# Patient Record
Sex: MALE | Race: WHITE | NOT HISPANIC OR LATINO | Employment: OTHER | URBAN - METROPOLITAN AREA
[De-identification: names, ages, dates, MRNs, and addresses within clinical notes are randomized per-mention and may not be internally consistent; named-entity substitution may affect disease eponyms.]

---

## 2023-03-26 DIAGNOSIS — G89.4 CHRONIC PAIN SYNDROME: Primary | ICD-10-CM

## 2023-03-27 RX ORDER — NAPROXEN 25 MG/ML
SUSPENSION ORAL
Qty: 500 ML | Refills: 1 | Status: SHIPPED | OUTPATIENT
Start: 2023-03-27 | End: 2023-07-11

## 2023-04-22 DIAGNOSIS — G89.4 CHRONIC PAIN SYNDROME: Primary | ICD-10-CM

## 2023-04-24 RX ORDER — LIDOCAINE 50 MG/G
PATCH TOPICAL
Qty: 150 PATCH | Refills: 0 | Status: SHIPPED | OUTPATIENT
Start: 2023-04-24 | End: 2023-05-05

## 2023-05-05 DIAGNOSIS — G89.4 CHRONIC PAIN SYNDROME: ICD-10-CM

## 2023-05-05 RX ORDER — LIDOCAINE 50 MG/G
PATCH TOPICAL
Qty: 150 PATCH | Refills: 0 | Status: SHIPPED
Start: 2023-05-05 | End: 2024-02-15 | Stop reason: ALTCHOICE

## 2023-07-10 DIAGNOSIS — G89.4 CHRONIC PAIN SYNDROME: ICD-10-CM

## 2023-07-11 RX ORDER — NAPROXEN 25 MG/ML
SUSPENSION ORAL
Qty: 500 ML | Refills: 1 | Status: SHIPPED | OUTPATIENT
Start: 2023-07-11 | End: 2024-01-31

## 2023-07-17 PROBLEM — L03.90 CELLULITIS: Status: RESOLVED | Noted: 2023-07-17 | Resolved: 2023-07-17

## 2023-07-17 PROBLEM — M79.672 FOOT PAIN, BILATERAL: Status: ACTIVE | Noted: 2023-07-17

## 2023-07-17 PROBLEM — B35.1 TOENAIL FUNGUS: Status: ACTIVE | Noted: 2023-07-17

## 2023-07-17 PROBLEM — T07.XXXA WOUNDS, MULTIPLE: Status: ACTIVE | Noted: 2023-07-17

## 2023-07-17 PROBLEM — U07.1 COVID-19: Status: RESOLVED | Noted: 2023-07-17 | Resolved: 2023-07-17

## 2023-07-17 PROBLEM — M20.10 VALGUS DEFORMITY OF GREAT TOE: Status: ACTIVE | Noted: 2023-07-17

## 2023-07-17 PROBLEM — M61.10: Status: ACTIVE | Noted: 2023-07-17

## 2023-07-17 PROBLEM — M79.671 FOOT PAIN, BILATERAL: Status: ACTIVE | Noted: 2023-07-17

## 2023-07-17 PROBLEM — N49.2: Status: RESOLVED | Noted: 2023-07-17 | Resolved: 2023-07-17

## 2023-07-17 PROBLEM — L98.9 SKIN LESION: Status: ACTIVE | Noted: 2023-07-17

## 2023-07-17 PROBLEM — G47.9 SLEEP DISORDER: Status: ACTIVE | Noted: 2023-07-17

## 2023-07-17 PROBLEM — R26.9 GAIT DISORDER: Status: ACTIVE | Noted: 2023-07-17

## 2023-07-17 PROBLEM — M79.89 ARM SWELLING: Status: ACTIVE | Noted: 2023-07-17

## 2023-07-17 PROBLEM — M79.602 PAIN OF LEFT UPPER EXTREMITY: Status: ACTIVE | Noted: 2023-07-17

## 2023-07-17 PROBLEM — K40.90 INGUINAL HERNIA: Status: ACTIVE | Noted: 2023-07-17

## 2023-07-17 PROBLEM — R60.0 LEG EDEMA: Status: RESOLVED | Noted: 2023-07-17 | Resolved: 2023-07-17

## 2023-07-17 PROBLEM — J06.9 ACUTE UPPER RESPIRATORY INFECTION: Status: RESOLVED | Noted: 2023-07-17 | Resolved: 2023-07-17

## 2023-07-17 PROBLEM — K59.00 CONSTIPATION: Status: ACTIVE | Noted: 2023-07-17

## 2023-07-17 PROBLEM — G89.4 CHRONIC PAIN SYNDROME: Status: ACTIVE | Noted: 2023-07-17

## 2023-07-17 RX ORDER — POLYETHYLENE GLYCOL 3350 17 G/17G
POWDER, FOR SOLUTION ORAL
COMMUNITY
Start: 2023-02-14

## 2023-07-17 RX ORDER — CICLOPIROX 1 G/100ML
SHAMPOO TOPICAL
COMMUNITY
End: 2023-07-18 | Stop reason: SDUPTHER

## 2023-07-18 ENCOUNTER — OFFICE VISIT (OUTPATIENT)
Dept: PRIMARY CARE | Facility: CLINIC | Age: 33
End: 2023-07-18
Payer: MEDICAID

## 2023-07-18 VITALS
BODY MASS INDEX: 17.67 KG/M2 | WEIGHT: 90 LBS | HEART RATE: 97 BPM | SYSTOLIC BLOOD PRESSURE: 98 MMHG | DIASTOLIC BLOOD PRESSURE: 68 MMHG | HEIGHT: 60 IN | OXYGEN SATURATION: 97 %

## 2023-07-18 DIAGNOSIS — L30.9 DERMATITIS: ICD-10-CM

## 2023-07-18 DIAGNOSIS — M61.10 FOP (FIBRODYSPLASIA OSSIFICANS PROGRESSIVA): Primary | ICD-10-CM

## 2023-07-18 PROCEDURE — 99213 OFFICE O/P EST LOW 20 MIN: CPT | Performed by: INTERNAL MEDICINE

## 2023-07-18 PROCEDURE — 1036F TOBACCO NON-USER: CPT | Performed by: INTERNAL MEDICINE

## 2023-07-18 RX ORDER — FEEDING PUMP
EACH MISCELLANEOUS
COMMUNITY
Start: 2022-07-27

## 2023-07-18 RX ORDER — CICLOPIROX 1 G/100ML
1 SHAMPOO TOPICAL 3 TIMES WEEKLY
Qty: 120 ML | Refills: 3 | Status: SHIPPED | OUTPATIENT
Start: 2023-07-19 | End: 2024-02-15 | Stop reason: SDUPTHER

## 2023-07-18 RX ORDER — PREDNISONE 20 MG/1
TABLET ORAL
COMMUNITY
Start: 2016-12-22 | End: 2024-02-15 | Stop reason: SDUPTHER
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
## 2023-08-04 DIAGNOSIS — M62.50 MUSCULAR ATROPHY, UNSPECIFIED SITE: Primary | ICD-10-CM

## 2023-11-09 PROBLEM — B35.1 ONYCHOMYCOSIS: Status: ACTIVE | Noted: 2023-11-09

## 2023-11-09 PROBLEM — L02.92 FURUNCLE: Status: ACTIVE | Noted: 2023-11-09
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
## 2023-11-09 PROBLEM — R52 CHRONIC GENERALIZED PAIN: Status: ACTIVE | Noted: 2023-11-09

## 2023-11-09 PROBLEM — M20.10 HAV (HALLUX ABDUCTO VALGUS): Status: ACTIVE | Noted: 2023-11-09

## 2023-11-09 PROBLEM — G89.29 CHRONIC GENERALIZED PAIN: Status: ACTIVE | Noted: 2023-11-09

## 2023-11-09 PROBLEM — L21.9 SEBORRHEIC DERMATITIS, UNSPECIFIED: Status: ACTIVE | Noted: 2021-07-21

## 2023-11-09 PROBLEM — L85.3 XEROSIS CUTIS: Status: ACTIVE | Noted: 2021-07-21

## 2023-11-09 PROBLEM — K40.90 UNILATERAL INGUINAL HERNIA, WITHOUT OBSTRUCTION OR GANGRENE, NOT SPECIFIED AS RECURRENT: Status: ACTIVE | Noted: 2022-10-10

## 2023-11-09 PROBLEM — M61.10: Status: ACTIVE | Noted: 2023-11-09

## 2023-11-09 RX ORDER — DOCUSATE SODIUM 100 MG/1
CAPSULE, LIQUID FILLED ORAL 2 TIMES DAILY
COMMUNITY
Start: 2022-10-10

## 2023-11-09 RX ORDER — CICLOPIROX OLAMINE 7.7 MG/G
1 CREAM TOPICAL
COMMUNITY
Start: 2021-07-21

## 2023-11-09 RX ORDER — LIDOCAINE 560 MG/1
1 PATCH PERCUTANEOUS; TOPICAL; TRANSDERMAL EVERY 12 HOURS PRN
COMMUNITY
End: 2024-02-15 | Stop reason: ALTCHOICE

## 2023-11-09 RX ORDER — HYDROCORTISONE 25 MG/G
1 CREAM TOPICAL
COMMUNITY
Start: 2021-07-21 | End: 2024-02-15 | Stop reason: ALTCHOICE

## 2023-11-09 RX ORDER — FLUOCINOLONE ACETONIDE 0.1 MG/ML
1 SOLUTION TOPICAL
COMMUNITY
Start: 2021-07-21

## 2023-11-09 RX ORDER — HYDROXYZINE HYDROCHLORIDE 10 MG/1
TABLET, FILM COATED ORAL 4 TIMES DAILY
COMMUNITY
Start: 2022-10-10 | End: 2024-02-15 | Stop reason: ALTCHOICE

## 2023-11-10 ENCOUNTER — TELEMEDICINE CLINICAL SUPPORT (OUTPATIENT)
Dept: NUTRITION | Facility: CLINIC | Age: 33
End: 2023-11-10
Payer: MEDICAID

## 2023-11-10 VITALS — BODY MASS INDEX: 17.66 KG/M2 | HEIGHT: 60 IN | WEIGHT: 89.95 LBS

## 2023-11-10 DIAGNOSIS — M61.10 FOP (FIBRODYSPLASIA OSSIFICANS PROGRESSIVA): Primary | ICD-10-CM

## 2023-11-10 DIAGNOSIS — M62.50 MUSCULAR ATROPHY, UNSPECIFIED SITE: ICD-10-CM

## 2023-11-10 PROCEDURE — 97802 MEDICAL NUTRITION INDIV IN: CPT | Performed by: DIETITIAN, REGISTERED

## 2023-11-10 PROCEDURE — 97802 MEDICAL NUTRITION INDIV IN: CPT | Mod: GT,U1,PO | Performed by: DIETITIAN, REGISTERED
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
## 2024-01-23 DIAGNOSIS — G89.4 CHRONIC PAIN SYNDROME: ICD-10-CM

## 2024-01-31 RX ORDER — NAPROXEN 25 MG/ML
SUSPENSION ORAL
Qty: 500 ML | Refills: 0 | Status: SHIPPED | OUTPATIENT
Start: 2024-01-31 | End: 2024-02-15 | Stop reason: SDUPTHER

## 2024-02-15 ENCOUNTER — TELEMEDICINE (OUTPATIENT)
Dept: PRIMARY CARE | Facility: CLINIC | Age: 34
End: 2024-02-15
Payer: MEDICAID

## 2024-02-15 DIAGNOSIS — G89.4 CHRONIC PAIN SYNDROME: ICD-10-CM

## 2024-02-15 DIAGNOSIS — L30.9 DERMATITIS: ICD-10-CM

## 2024-02-15 DIAGNOSIS — K40.90 RIGHT INGUINAL HERNIA: Primary | ICD-10-CM

## 2024-02-15 DIAGNOSIS — M61.10 FOP (FIBRODYSPLASIA OSSIFICANS PROGRESSIVA): ICD-10-CM

## 2024-02-15 PROCEDURE — 1036F TOBACCO NON-USER: CPT | Performed by: INTERNAL MEDICINE

## 2024-02-15 PROCEDURE — 99441 PR PHYS/QHP TELEPHONE EVALUATION 5-10 MIN: CPT | Performed by: INTERNAL MEDICINE

## 2024-02-15 PROCEDURE — 3008F BODY MASS INDEX DOCD: CPT | Performed by: INTERNAL MEDICINE

## 2024-02-15 RX ORDER — CICLOPIROX 1 G/100ML
1 SHAMPOO TOPICAL 3 TIMES WEEKLY
Qty: 120 ML | Refills: 3 | Status: SHIPPED | OUTPATIENT
Start: 2024-02-16

## 2024-02-15 RX ORDER — PREDNISONE 20 MG/1
20 TABLET ORAL DAILY
Qty: 90 TABLET | Refills: 1 | Status: SHIPPED | OUTPATIENT
Start: 2024-02-15

## 2024-02-15 RX ORDER — NAPROXEN 25 MG/ML
SUSPENSION ORAL
Qty: 500 ML | Refills: 0 | Status: SHIPPED | OUTPATIENT
Start: 2024-02-15 | End: 2024-04-15

## 2024-02-15 NOTE — PROGRESS NOTES
Subjective   Patient ID: James Pittman is a 33 y.o. male who presents for Follow-up and Med Refill.  HPI        past medical history fibrodysplasia ossificans progressiva, constipation, and R inguinal hernia.     Patient overall doing okay but does note chronic right inguinal hernia intermittently over the years grade 3 out of 10 sometimes worse with walking standing moving lifting  Occasionally has a little bit of pain in the area none at present  Denies any constipation obstipation  Patient states he was to have a virtual appointment with the surgeon but he states the surgeon never showed up so he did not get it evaluated it was a coordinated call with his specialist with FOP as well      Health Maintenance:      Colonoscopy:      Mammogram:      Pelvic/Pap:      Low dose chest CT:      Aorta duplex:      Optho:      Podiatry:        Vaccines:      Prevnar 20:      Prevnar 13:      Pneumovax 23:      Tdap:      Shingrix:      COVID:      Influenza:        ROS:      General: denies fever/chills/weight loss      Head: denies HA/trauma/masses/dizziness      Eyes: denies vision change/loss of vision/blurry vision/diplopia/eye pain      Ears: denies hearing loss/tinnitus/otalgia/otorrhea      Nose: denies nasal drainage/anosmia      Throat: denies dysphagia/odynophagia      Lymphatics: denies lymph node swelling      Cardiac: denies CP/palpitations/orthopnea/PND      Pulmonary: denies dyspnea/cough/wheezing      GI: Chronic intermittent inguinal hernia denies abd pain/n/v/diarrhea/melena/hematochezia/hematemesis      : denies dysuria/hematuria/change frequency      Genital: denies genital discharge/lesions      Skin: Chronic intermittent skin ulcerations from his underlying FOP condition states he has a spot on the right leg but seems to be improving denies rashes/lesions/masses      MSK: denies weakness/swelling/edema/gait imbalance/pain      Neuro: denies paresthesias/seizures/dysarthria      Psych: denies  "depression/anxiety/suicidal or homicidal ideations            Objective   There were no vitals taken for this visit.     Physical Exam:     General: AO3, NAD     Speaking full sentences nonlabored  Appropriate mood and affect                    No results found for: \"BMPR1A\", \"CBCDIF\"      Assessment/Plan   Diagnoses and all orders for this visit:  Right inguinal hernia  Comments:  Reducible  Orders:  -     Referral to General Surgery; Future  Dermatitis  -     ciclopirox 1 % shampoo; Apply 1 Application topically 3 times a week.  Chronic pain syndrome  -     naproxen (Naprosyn) 125 mg/5 mL suspension; TAKE 10ML BY MOUTH TWO TIMES A DAY AS NEEDED FOR PAIN  -     predniSONE (Deltasone) 20 mg tablet; Take 1 tablet (20 mg) by mouth once daily.  FOP (fibrodysplasia ossificans progressiva)    Go to the ER for any severe pain or hernia that does not go back in    Continue home care nursing    Thank you for making appointment today Héctor    Please follow-up 6 months    Ezra Mccartney DO, FACOI       Ezra Mccartney DOFollow up and med refill  "

## 2024-04-13 DIAGNOSIS — G89.4 CHRONIC PAIN SYNDROME: ICD-10-CM

## 2024-04-15 RX ORDER — NAPROXEN 25 MG/ML
SUSPENSION ORAL
Qty: 500 ML | Refills: 0 | Status: SHIPPED | OUTPATIENT
Start: 2024-04-15

## 2024-06-18 ENCOUNTER — APPOINTMENT (OUTPATIENT)
Dept: PRIMARY CARE | Facility: CLINIC | Age: 34
End: 2024-06-18
Payer: MEDICAID

## 2024-07-18 ENCOUNTER — APPOINTMENT (OUTPATIENT)
Dept: PRIMARY CARE | Facility: CLINIC | Age: 34
End: 2024-07-18
Payer: MEDICAID

## 2024-07-18 VITALS — SYSTOLIC BLOOD PRESSURE: 102 MMHG | DIASTOLIC BLOOD PRESSURE: 68 MMHG

## 2024-07-18 DIAGNOSIS — G89.4 CHRONIC PAIN SYNDROME: ICD-10-CM

## 2024-07-18 DIAGNOSIS — K64.9 HEMORRHOIDS, UNSPECIFIED HEMORRHOID TYPE: ICD-10-CM

## 2024-07-18 DIAGNOSIS — L30.9 DERMATITIS: ICD-10-CM

## 2024-07-18 DIAGNOSIS — M61.10 FOP (FIBRODYSPLASIA OSSIFICANS PROGRESSIVA): Primary | ICD-10-CM

## 2024-07-18 PROCEDURE — 99215 OFFICE O/P EST HI 40 MIN: CPT | Performed by: INTERNAL MEDICINE

## 2024-07-18 RX ORDER — PREDNISONE 20 MG/1
20 TABLET ORAL DAILY
Qty: 90 TABLET | Refills: 0 | Status: SHIPPED | OUTPATIENT
Start: 2024-07-18

## 2024-07-18 RX ORDER — CICLOPIROX 1 G/100ML
1 SHAMPOO TOPICAL 3 TIMES WEEKLY
Qty: 120 ML | Refills: 3 | Status: SHIPPED | OUTPATIENT
Start: 2024-07-18

## 2024-07-18 RX ORDER — HYDROCORTISONE ACETATE 25 MG/1
25 SUPPOSITORY RECTAL 2 TIMES DAILY PRN
Qty: 30 SUPPOSITORY | Refills: 11 | Status: SHIPPED | OUTPATIENT
Start: 2024-07-18 | End: 2025-07-18

## 2024-07-18 RX ORDER — CICLOPIROX 1 G/100ML
1 SHAMPOO TOPICAL 3 TIMES WEEKLY
Qty: 120 ML | Refills: 3 | Status: SHIPPED | OUTPATIENT
Start: 2024-07-19 | End: 2024-07-18 | Stop reason: SDUPTHER

## 2024-07-18 RX ORDER — NAPROXEN 25 MG/ML
SUSPENSION ORAL
Qty: 500 ML | Refills: 1 | Status: SHIPPED | OUTPATIENT
Start: 2024-07-18

## 2024-07-18 RX ORDER — DOCUSATE SODIUM 100 MG/1
100 CAPSULE, LIQUID FILLED ORAL 2 TIMES DAILY
Qty: 60 CAPSULE | Refills: 2 | Status: SHIPPED
Start: 2024-07-18 | End: 2024-07-19 | Stop reason: ALTCHOICE

## 2024-07-18 NOTE — PROGRESS NOTES
Subjective   Patient ID: James Pittman is a 33 y.o. male who presents for Follow-up (Few questions/Med refill).  HPI        past medical history fibrodysplasia ossificans progressiva, constipation, and R inguinal hernia.     Patient presents with mother Rachel    Patient overall doing okay but does note chronic right inguinal hernia intermittently over the years grade 3 out of 10 sometimes worse with walking standing moving lifting he never did see general surgery yet as ordered but states he can never have a surgery given his history of FOP he was told this by a doctor specialist and [place] that handles this condition  Occasionally has a little bit of pain in the area none at present  He does have some chronic constipation intermittently; tried MiraLAX but had excessive stooling  He has reported hemorrhoid that occasionally pops out the mother has put on Preparation H zinc Tucks pads that has helped decrease the size of it  Occasional urine incontinence seems worse with the hemorrhoid issue when it bulges out  Denies hematochezia melena nausea vomiting fever chills dysuria hematuria abdominal pain    Patient states he was to have a virtual appointment with the surgeon but he states the surgeon never showed up so he did not get it evaluated it was a coordinated call with his specialist with FOP as well      Health Maintenance:      Colonoscopy:      Mammogram:      Pelvic/Pap:      Low dose chest CT:      Aorta duplex:      Optho:      Podiatry:        Vaccines:      Prevnar 20:      Prevnar 13:      Pneumovax 23:      Tdap:      Shingrix:      COVID:      Influenza:        ROS:      General: denies fever/chills/weight loss      Head: denies HA/trauma/masses/dizziness      Eyes: denies vision change/loss of vision/blurry vision/diplopia/eye pain      Ears: denies hearing loss/tinnitus/otalgia/otorrhea      Nose: denies nasal drainage/anosmia      Throat: denies dysphagia/odynophagia      Lymphatics: denies lymph  node swelling      Cardiac: denies CP/palpitations/orthopnea/PND      Pulmonary: denies dyspnea/cough/wheezing      GI: Chronic intermittent inguinal hernia right-sided persistent ; has a hemorrhoid that seems to pop out from his anus occasionally denies abd pain/n/v/diarrhea/melena/hematochezia/hematemesis      : Occasional urine incontinence over the last couple months when the hemorrhoid flares denies dysuria/hematuria/change frequency      Genital: denies genital discharge/lesions      Skin: Chronic intermittent skin ulcerations from his underlying FOP condition states he has a spot on the right leg posterior that is there enies rashes/lesions/masses      MSK: denies weakness/swelling/edema/gait imbalance/pain      Neuro: denies paresthesias/seizures/dysarthria      Psych: denies depression/anxiety/suicidal or homicidal ideations            Objective   /68      Physical Exam:     Physical Exam:    General: AOx3, NAD  Head: symmetric no masses/lesions  Eyes: EOMI/PERRLA, no scleral icterus or conjunctival erythema, negative APD  Ears: symmetric without deformity no masses/discharge, TMs pearly gray  Nose: nares symmetric without discharge, pink turbinates without masses  Throat: oral mucosa pink/moist without exudates or lesions  Neck: trachea midline, no thyromegaly or masses, negative carotid bruit  Lymphatics: no palpable pre or post auricular/ant or posterior cervical/supraclavicular/ axillary/epitrochlear/inguinal adenopathy  Cardiovascular: RRR , S1 and S2 auscultated, no murmur/rub/gallups. Negative s3 or s4.   Chest: No deformity and negative tenderness to palpation  Pulmonary:  CTA b/l, no wheeze/rhonchi/rales. Nonlabored  GI: Reducible right inguinal hernia about 2 cm soft, Nontender/Nondistended, BSx4. No rebound/guarding. No hepatosplenomegaly  Rectal no overt mass hemorrhoid good rectal tone no bleeding  : Not examined  Musculoskeletal: Contractures upper lower extremity wheelchair-bound  "right posterior leg 1 cm soft tissue type lesion growth no clubbing/cyanosis/edema. No deformity. Strength 5/5 UE and LE with full ROM.  2+ palpable DP/PTs b/l LE  Neurologic: CNII-XII grossly intact without focal deficits. 2/4 bicep/tricep/brachioradialis/patellar/Achilles reflex. Heel to shin and rapid alternating movements intact. Ambulation intact without assist.   Skin: No masses/lesions/tattoos  Psychiatric: affect appropriate                     No results found for: \"BMPR1A\", \"CBCDIF\"    Mother states due to FOP condition the patient cannot have any type of lab work or surgeries due to severe inflammatory response only needs a procedure if there is an emergency situation she reports per the FOP specialist in [place]  Assessment/Plan   Diagnoses and all orders for this visit:  FOP (fibrodysplasia ossificans progressiva)  -     Referral to Rheumatology; Future  Chronic pain syndrome  -     naproxen (Naprosyn) 125 mg/5 mL suspension; TAKE 10ML BY MOUTH TWICE DAILY AS NEEDED FOR PAIN  -     predniSONE (Deltasone) 20 mg tablet; Take 1 tablet (20 mg) by mouth once daily.  Dermatitis  -     ciclopirox 1 % shampoo; Apply 1 Application topically 3 times a week.  Hemorrhoids, unspecified hemorrhoid type  Comments:  Suspect internal with intermittent prolapse  Orders:  -     docusate sodium (Colace) 100 mg capsule; Take 1 capsule (100 mg) by mouth 2 times a day.  -     hydrocortisone (Anusol-HC) 25 mg suppository; Insert 1 suppository (25 mg) into the rectum 2 times a day as needed for hemorrhoids.  -     Referral to Colorectal Surgery; Future    Go to the ER for any severe pain or hernia that does not go back in    As we discussed I recommend talking about this medicine you inquired about with rheumatology for FOP Palvarotene which is an vitamin A analog with many side effects that are toxic    May be a candidate for a banding procedure if internal hemorrhoid likely need EUA anoscopy to further " evaluate  Recommend bowel regimen Colace 1 scoop of Benefiber daily; discontinue MiraLAX use    Will need urology referral if incontinence issues persist after the hemorrhoid feels better    Continue home care nursing    Thank you for making appointment today Héctor    Please follow-up 6 months    Ezra Mccartney DO, FACOI Seth D Levine, DOFollow up and med refill

## 2024-07-19 ENCOUNTER — TELEPHONE (OUTPATIENT)
Dept: PRIMARY CARE | Facility: CLINIC | Age: 34
End: 2024-07-19
Payer: MEDICAID

## 2024-07-19 DIAGNOSIS — K64.8 INTERNAL HEMORRHOID: Primary | ICD-10-CM

## 2024-07-19 RX ORDER — DOCUSATE SODIUM 50 MG/5ML
50 LIQUID ORAL 2 TIMES DAILY
Qty: 200 ML | Refills: 2 | Status: SHIPPED | OUTPATIENT
Start: 2024-07-19 | End: 2024-07-29

## 2024-07-19 RX ORDER — HYDROCORTISONE 25 MG/G
CREAM TOPICAL 2 TIMES DAILY PRN
Qty: 30 G | Refills: 2 | Status: SHIPPED | OUTPATIENT
Start: 2024-07-19 | End: 2025-07-19

## 2024-07-19 NOTE — TELEPHONE ENCOUNTER
Patient can't swallow capsules can you please send a liquid stool softener.  Also wants to know if they can use tucks in between for comfort

## 2024-08-15 ENCOUNTER — TELEPHONE (OUTPATIENT)
Dept: PRIMARY CARE | Facility: CLINIC | Age: 34
End: 2024-08-15
Payer: MEDICAID

## 2024-08-15 NOTE — TELEPHONE ENCOUNTER
Mom called, asking for a script for Boost be faxed to [facility]  [phone]  Patient uses 2 aday

## 2024-10-07 ENCOUNTER — TELEPHONE (OUTPATIENT)
Dept: PRIMARY CARE | Facility: CLINIC | Age: 34
End: 2024-10-07
Payer: MEDICAID

## 2024-10-07 DIAGNOSIS — N39.0 URINARY TRACT INFECTION WITHOUT HEMATURIA, SITE UNSPECIFIED: Primary | ICD-10-CM

## 2024-10-07 RX ORDER — AMOXICILLIN AND CLAVULANATE POTASSIUM 500; 125 MG/1; MG/1
500 TABLET, FILM COATED ORAL 2 TIMES DAILY
Qty: 10 TABLET | Refills: 2 | Status: SHIPPED
Start: 2024-10-07 | End: 2024-10-08 | Stop reason: ALTCHOICE

## 2024-10-07 NOTE — TELEPHONE ENCOUNTER
James is having cloudy urine and sells bad wanted to know if you can send in medication (he is unable to give urine sample)

## 2024-10-08 ENCOUNTER — TELEPHONE (OUTPATIENT)
Dept: RHEUMATOLOGY | Facility: CLINIC | Age: 34
End: 2024-10-08
Payer: MEDICAID

## 2024-10-08 DIAGNOSIS — N39.0 URINARY TRACT INFECTION WITHOUT HEMATURIA, SITE UNSPECIFIED: Primary | ICD-10-CM

## 2024-10-08 RX ORDER — AMOXICILLIN 400 MG/5ML
POWDER, FOR SUSPENSION ORAL
Qty: 50 ML | Refills: 2 | Status: SHIPPED | OUTPATIENT
Start: 2024-10-08

## 2024-10-08 NOTE — TELEPHONE ENCOUNTER
----- Message from Selina Ortiz sent at 10/8/2024 12:30 PM EDT -----  No I do not specialized In this disorder... please clarify with them this is why they are coming  ----- Message -----  From: Wolfgang Henderson MA  Sent: 10/8/2024  12:15 PM EDT  To: Selina Ortiz, DO    FOP (fibrodysplasia ossificans progressiva) [M61.10]    Is this a dx you treat?

## 2024-10-17 ENCOUNTER — APPOINTMENT (OUTPATIENT)
Dept: PRIMARY CARE | Facility: CLINIC | Age: 34
End: 2024-10-17
Payer: MEDICAID

## 2024-10-17 VITALS — SYSTOLIC BLOOD PRESSURE: 104 MMHG | DIASTOLIC BLOOD PRESSURE: 72 MMHG

## 2024-10-17 DIAGNOSIS — M61.10 FIBRODYSPLASIA OSSIFICANS PROGRESSIVA: ICD-10-CM

## 2024-10-17 DIAGNOSIS — R26.81 GAIT INSTABILITY: Primary | ICD-10-CM

## 2024-10-17 DIAGNOSIS — M61.10 FOP (FIBRODYSPLASIA OSSIFICANS PROGRESSIVA): ICD-10-CM

## 2024-10-17 DIAGNOSIS — K40.90 INGUINAL HERNIA WITHOUT OBSTRUCTION OR GANGRENE, RECURRENCE NOT SPECIFIED, UNSPECIFIED LATERALITY: ICD-10-CM

## 2024-10-17 PROCEDURE — 99214 OFFICE O/P EST MOD 30 MIN: CPT | Performed by: INTERNAL MEDICINE

## 2024-10-17 PROCEDURE — 1036F TOBACCO NON-USER: CPT | Performed by: INTERNAL MEDICINE

## 2024-10-17 NOTE — PROGRESS NOTES
Subjective   Patient ID: James Pittman is a 33 y.o. male who presents for Follow-up.  HPI        past medical history fibrodysplasia ossificans progressiva, constipation, and R inguinal hernia.     Patient presents with mother Rachel    Patient overall doing okay but does note chronic right inguinal hernia intermittently over the years may be getting better with time grade 2 out of 10 sometimes worse with walking standing moving lifting he never did see general surgery yet as ordered but states he can never have a surgery given his history of FOP he was told this by a doctor specialist and [place] that handles this condition  Occasionally has a little bit of pain in the area none at present seems to be getting better    He has reported hemorrhoid that occasionally pops out the mother has put on Preparation H zinc Tucks pads that has helped decrease the size of it  Occasional urine incontinence seems worse with the hemorrhoid issue when it bulges out  Denies hematochezia melena nausea vomiting fever chills dysuria hematuria abdominal pain    Patient states he was to have a virtual appointment with the surgeon but he states the surgeon never showed up so he did not get it evaluated it was a coordinated call with his specialist with FOP as well      Health Maintenance:      Colonoscopy:      Mammogram:      Pelvic/Pap:      Low dose chest CT:      Aorta duplex:      Optho:      Podiatry:        Vaccines:      Prevnar 20:      Prevnar 13:      Pneumovax 23:      Tdap:      Shingrix:      COVID:      Influenza:        ROS:      General: denies fever/chills/weight loss      Head: denies HA/trauma/masses/dizziness      Eyes: denies vision change/loss of vision/blurry vision/diplopia/eye pain      Ears: denies hearing loss/tinnitus/otalgia/otorrhea      Nose: denies nasal drainage/anosmia      Throat: denies dysphagia/odynophagia      Lymphatics: denies lymph node swelling      Cardiac: denies  CP/palpitations/orthopnea/PND      Pulmonary: denies dyspnea/cough/wheezing      GI: Chronic intermittent inguinal hernia right-sided persistent but getting somewhat better; has a hemorrhoid that seems to pop out from his anus occasionally resolved at present denies abd pain/n/v/diarrhea/melena/hematochezia/hematemesis      : Occasional urine incontinence over the last couple months got better with antibiotics Augmentin also urine less cloudy now resolved denies dysuria/hematuria/change frequency      Genital: denies genital discharge/lesions      Skin: Chronic intermittent skin ulcerations from his underlying FOP condition s no new lesions enies rashes/lesions/masses      MSK: denies weakness/swelling/edema/gait imbalance/pain      Neuro: denies paresthesias/seizures/dysarthria      Psych: denies depression/anxiety/suicidal or homicidal ideations            Objective   /72      Physical Exam:     Physical Exam:    General: AOx3, NAD  Head: symmetric no masses/lesions  Eyes: EOMI/PERRLA, no scleral icterus or conjunctival erythema, negative APD  Ears: symmetric without deformity no masses/discharge, TMs pearly gray  Nose: nares symmetric without discharge, pink turbinates without masses  Throat: oral mucosa pink/moist without exudates or lesions  Neck: trachea midline, no thyromegaly or masses, negative carotid bruit  Lymphatics: no palpable pre or post auricular/ant or posterior cervical/supraclavicular/ axillary/epitrochlear/inguinal adenopathy  Cardiovascular: RRR , S1 and S2 auscultated, no murmur/rub/gallups. Negative s3 or s4.   Chest: No deformity and negative tenderness to palpation  Pulmonary:  CTA b/l, no wheeze/rhonchi/rales. Nonlabored  GI: Reducible right inguinal hernia about 1.5cm soft, Nontender/Nondistended, BSx4. No rebound/guarding. No hepatosplenomegaly  Rectal no overt mass hemorrhoid good rectal tone no bleeding  : Not examined  Musculoskeletal: Contractures upper lower extremity  "wheelchair-bound no clubbing/cyanosis/edema. No deformity. Strength 5/5 UE and LE with full ROM.  2+ palpable DP/PTs b/l LE  Neurologic: CNII-XII grossly intact without focal deficits. 2/4 bicep/tricep/brachioradialis/patellar/Achilles reflex. Heel to shin and rapid alternating movements intact. Ambulation intact without assist.   Skin: No masses/lesions/tattoos  Psychiatric: affect appropriate                     No results found for: \"BMPR1A\", \"CBCDIF\"    Mother states due to FOP condition the patient cannot have any type of lab work or surgeries due to severe inflammatory response only needs a procedure if there is an emergency situation she reports per the FOP specialist in [place]    Patient states he tried to get into rheumatology but they canceled his appointment because they said they do not deal with FOP he has questions about palvarotene that apparently is a retinoid based medicine that can prevent further formation of FOP lesions I advised him he needs to talk to a specialist about this medicine but in my opinion would not recommend you feel comfortable with writing this medicine due to the side effect profile and the fact that this is more for prevention of new lesions which are not getting but you should talk to your specialist about this ultimately  Assessment/Plan   Diagnoses and all orders for this visit:  Gait instability  -     Referral to Occupational Therapy; Future  -     Referral to Physical Therapy; Future  FOP (fibrodysplasia ossificans progressiva)  -     Referral to Occupational Therapy; Future  -     Referral to Physical Therapy; Future  Inguinal hernia without obstruction or gangrene, recurrence not specified, unspecified laterality  Fibrodysplasia ossificans progressiva    Go to the ER for any severe pain or hernia that does not go back in    I sent a message to rheumatology inquiring about the medication palovarotene, in which specialty they would recommend being that rheumatology " does not handle this condition they stated    May be a candidate for a banding procedure if internal hemorrhoid likely need EUA anoscopy to further evaluate  Recommend bowel regimen Colace 1 scoop of Benefiber daily; discontinue MiraLAX use    Patient requested a PT OT referral for a shower commode chair evaluation    Continue home care nursing    Thank you for making appointment today Héctor    Please follow-up 6 months    Ezra Mccartney DO, FACCAYDEN Mccartney DOFollow up and med refill

## 2024-10-18 ENCOUNTER — DOCUMENTATION (OUTPATIENT)
Dept: PRIMARY CARE | Facility: CLINIC | Age: 34
End: 2024-10-18
Payer: MEDICAID

## 2024-10-18 NOTE — PROGRESS NOTES
Thanks Selina, he has a physician apparently from [place] that he is in contact with so advised him to maintain follow up with that doctor as well since it doesn't seem [facility] offers this coverage for FOP as you mentioned  ===View-only below this line===  ----- Message -----  From: Selina Ortiz DO  Sent: 10/17/2024   3:43 PM EDT  To: Ezra Mccartney DO  Subject: RE: FOP                                          Hello,  I am not familiar  with the management of this condition.  Unfortunately since this is pretty rare, I am not aware.  I think looking at articles on this condition may give some ideas of where to send patient..this may be outside of the institution like [facility].  Sorry I cannot be of help  ----- Message -----  From: Ezra Mccartney DO  Sent: 10/17/2024   1:15 PM EDT  To: Selina Ortiz DO  Subject: FOP                                              Selina patient has FOP, do you know of a specialist that handles this condition? Your office cancelled the appt, there is a new medicine palvarotene available he has questions about but in my opinion doesn't seem like a good idea since it more prevents new formation and has many side effects so I will not be writing for it.thanks  Ezra

## 2024-10-23 ENCOUNTER — APPOINTMENT (OUTPATIENT)
Dept: RHEUMATOLOGY | Facility: CLINIC | Age: 34
End: 2024-10-23
Payer: MEDICAID

## 2025-02-03 ENCOUNTER — TELEPHONE (OUTPATIENT)
Dept: PRIMARY CARE | Facility: CLINIC | Age: 35
End: 2025-02-03
Payer: MEDICAID

## 2025-02-03 NOTE — TELEPHONE ENCOUNTER
Pt having incontinence problems, home care told to have [facility] .

## 2025-05-01 ENCOUNTER — APPOINTMENT (OUTPATIENT)
Dept: PRIMARY CARE | Facility: CLINIC | Age: 35
End: 2025-05-01
Payer: MEDICAID

## 2025-05-01 VITALS — SYSTOLIC BLOOD PRESSURE: 102 MMHG | BODY MASS INDEX: 25.04 KG/M2 | DIASTOLIC BLOOD PRESSURE: 64 MMHG | WEIGHT: 100 LBS

## 2025-05-01 DIAGNOSIS — G89.4 CHRONIC PAIN SYNDROME: ICD-10-CM

## 2025-05-01 DIAGNOSIS — B35.1 ONYCHOMYCOSIS: Primary | ICD-10-CM

## 2025-05-01 DIAGNOSIS — L30.9 DERMATITIS: ICD-10-CM

## 2025-05-01 PROCEDURE — 99214 OFFICE O/P EST MOD 30 MIN: CPT | Performed by: INTERNAL MEDICINE

## 2025-05-01 PROCEDURE — 1036F TOBACCO NON-USER: CPT | Performed by: INTERNAL MEDICINE

## 2025-05-01 RX ORDER — CICLOPIROX 1 G/100ML
1 SHAMPOO TOPICAL 3 TIMES WEEKLY
Qty: 120 ML | Refills: 3 | Status: CANCELLED | OUTPATIENT
Start: 2025-05-02

## 2025-05-01 RX ORDER — CICLOPIROX 80 MG/ML
SOLUTION TOPICAL NIGHTLY
Qty: 6.6 ML | Refills: 2 | Status: SHIPPED | OUTPATIENT
Start: 2025-05-01

## 2025-05-01 RX ORDER — CICLOPIROX 1 G/100ML
1 SHAMPOO TOPICAL 3 TIMES WEEKLY
Qty: 120 ML | Refills: 3 | Status: SHIPPED | OUTPATIENT
Start: 2025-05-02

## 2025-05-01 RX ORDER — NAPROXEN 25 MG/ML
SUSPENSION ORAL
Qty: 500 ML | Refills: 1 | Status: SHIPPED | OUTPATIENT
Start: 2025-05-01

## 2025-05-01 NOTE — PROGRESS NOTES
Subjective   Patient ID: James Pittman is a 34 y.o. male who presents for Follow-up and Med Refill.  HPI        past medical history fibrodysplasia ossificans progressiva, constipation, and R inguinal hernia.     Patient presents with mother Rachel    Patient overall doing okay but does note chronic right inguinal hernia intermittently over the years may be getting better with time grade 2 out of 10 sometimes worse with walking standing moving lifting he never did see general surgery yet as ordered but states he can never have a surgery given his history of FOP he was told this by a doctor specialist and [place] that handles this condition  Occasionally has a little bit of pain in the area none at present seems to be getting better      Patient states he was to have a virtual appointment with the surgeon but he states the surgeon never showed up so he did not get it evaluated it was a coordinated call with his specialist with FOP as well      Health Maintenance:      Colonoscopy:      Mammogram:      Pelvic/Pap:      Low dose chest CT:      Aorta duplex:      Optho:      Podiatry:        Vaccines:      Prevnar 20:      Prevnar 13:      Pneumovax 23:      Tdap:      Shingrix:      COVID:      Influenza:        ROS:      General: denies fever/chills/weight loss      Head: denies HA/trauma/masses/dizziness      Eyes: denies vision change/loss of vision/blurry vision/diplopia/eye pain      Ears: denies hearing loss/tinnitus/otalgia/otorrhea      Nose: denies nasal drainage/anosmia      Throat: denies dysphagia/odynophagia      Lymphatics: denies lymph node swelling      Cardiac: denies CP/palpitations/orthopnea/PND      Pulmonary: denies dyspnea/cough/wheezing      GI: Chronic intermittent inguinal hernia right-sided persistent but getting somewhat better; has a hemorrhoid that seems to pop out from his anus occasionally resolved at present denies abd pain/n/v/diarrhea/melena/hematochezia/hematemesis      :  Occasional urine incontinence for months uses incontinence wipes that seem to help denies dysuria/hematuria/change frequency      Genital: denies genital discharge/lesions      Skin: Chronic intermittent skin ulcerations from his underlying FOP condition s no new lesions enies rashes/lesions/masses      MSK: Patient with uncontrolled toenail fungus growth had a history of debridement from the podiatrist and also use topical antifungal that maybe helped in the past denies weakness/swelling/edema/gait imbalance/pain      Neuro: denies paresthesias/seizures/dysarthria      Psych: denies depression/anxiety/suicidal or homicidal ideations            Objective   /64   Wt 45.4 kg (100 lb)   BMI 25.04 kg/m²      Physical Exam:     Physical Exam:    General: AOx3, NAD  Head: symmetric no masses/lesions  Eyes: EOMI/PERRLA, no scleral icterus or conjunctival erythema, negative APD  Ears: symmetric without deformity no masses/discharge, TMs pearly gray  Nose: nares symmetric without discharge, pink turbinates without masses  Throat: oral mucosa pink/moist without exudates or lesions  Neck: trachea midline, no thyromegaly or masses, negative carotid bruit  Lymphatics: no palpable pre or post auricular/ant or posterior cervical/supraclavicular/ axillary/epitrochlear/inguinal adenopathy  Cardiovascular: RRR , S1 and S2 auscultated, no murmur/rub/gallups. Negative s3 or s4.   Chest: No deformity and negative tenderness to palpation  Pulmonary:  CTA b/l, no wheeze/rhonchi/rales. Nonlabored  GI: Reducible right inguinal hernia about 1.5cm soft, Nontender/Nondistended, BSx4. No rebound/guarding. No hepatosplenomegaly  Rectal no overt mass hemorrhoid good rectal tone no bleeding  : Not examined  Musculoskeletal: Overgrown toenails with white cheesy torturous nails overgrowth under the nail contractures upper lower extremity wheelchair-bound no clubbing/cyanosis/edema. No deformity. Strength 5/5 UE and LE with full ROM.  2+  "palpable DP/PTs b/l LE  Neurologic: CNII-XII grossly intact without focal deficits. 2/4 bicep/tricep/brachioradialis/patellar/Achilles reflex. Heel to shin and rapid alternating movements intact. Ambulation intact without assist.   Skin: No masses/lesions/tattoos  Psychiatric: affect appropriate                     No results found for: \"BMPR1A\", \"CBCDIF\"    Mother states due to FOP condition the patient cannot have any type of lab work or surgeries due to severe inflammatory response only needs a procedure if there is an emergency situation she reports per the FOP specialist in [place]    Patient states he tried to get into rheumatology but they canceled his appointment because they said they do not deal with FOP he has questions about palvarotene that apparently is a retinoid based medicine that can prevent further formation of FOP lesions I advised him he needs to talk to a specialist about this medicine but in my opinion would not recommend you feel comfortable with writing this medicine due to the side effect profile and the fact that this is more for prevention of new lesions which are not getting but you should talk to your specialist about this ultimately    Patient deferred genetics referral  Assessment/Plan   Diagnoses and all orders for this visit:  Onychomycosis  -     ciclopirox (Penlac) 8 % solution; Apply topically once daily at bedtime.  -     Referral to Podiatry; Future  Dermatitis  -     ciclopirox 1 % shampoo; Apply 1 Application topically 3 times a week.  Chronic pain syndrome  -     naproxen (Naprosyn) 125 mg/5 mL suspension; TAKE 10ML BY MOUTH TWICE DAILY AS NEEDED FOR PAIN    Will need to follow-up with podiatry for debridement of the nails    Prescription written for incontinence wipes to be faxed to [facility] [phone] per your request, prescription in outbox    Go to the ER for any severe pain or hernia that does not go back in    I sent a message to rheumatology inquiring about " the medication palovarotene, in which specialty they would recommend being that rheumatology does not handle this condition they stated    May be a candidate for a banding procedure if internal hemorrhoid likely need EUA anoscopy to further evaluate  Recommend bowel regimen Colace 1 scoop of Benefiber daily; discontinue MiraLAX use    Continue home care nursing    Thank you for making appointment today Héctor    Please stop at the  to schedule follow-up 6 months as we discussed    Ezra Mccartney DO, FACCAYDEN Mccartney DOFollow up and med refill

## 2025-05-27 ENCOUNTER — TELEPHONE (OUTPATIENT)
Dept: PRIMARY CARE | Facility: CLINIC | Age: 35
End: 2025-05-27
Payer: MEDICAID

## 2025-05-27 DIAGNOSIS — R31.9 URINARY TRACT INFECTION WITH HEMATURIA, SITE UNSPECIFIED: Primary | ICD-10-CM

## 2025-05-27 DIAGNOSIS — R31.9 HEMATURIA, UNSPECIFIED TYPE: Primary | ICD-10-CM

## 2025-05-27 DIAGNOSIS — N39.0 URINARY TRACT INFECTION WITH HEMATURIA, SITE UNSPECIFIED: Primary | ICD-10-CM

## 2025-05-27 DIAGNOSIS — N39.0 URINARY TRACT INFECTION WITHOUT HEMATURIA, SITE UNSPECIFIED: ICD-10-CM

## 2025-05-27 RX ORDER — AMOXICILLIN 400 MG/5ML
POWDER, FOR SUSPENSION ORAL
Qty: 50 ML | Refills: 2 | Status: SHIPPED | OUTPATIENT
Start: 2025-05-27

## 2025-05-27 NOTE — TELEPHONE ENCOUNTER
Good Morning.  Patient's mom called and wanted to know how many days he should take the amoxicillin for.  [facility] is telling her 5 days, but, she thought it was for 10 days.  Currently taking it 2 x per day.  Thank you.

## 2025-05-29 ENCOUNTER — OFFICE VISIT (OUTPATIENT)
Dept: PRIMARY CARE | Facility: CLINIC | Age: 35
End: 2025-05-29
Payer: MEDICAID

## 2025-05-29 ENCOUNTER — HOSPITAL ENCOUNTER (OUTPATIENT)
Dept: RADIOLOGY | Facility: HOSPITAL | Age: 35
Discharge: HOME | End: 2025-05-29
Payer: MEDICAID

## 2025-05-29 VITALS — SYSTOLIC BLOOD PRESSURE: 102 MMHG | DIASTOLIC BLOOD PRESSURE: 68 MMHG

## 2025-05-29 DIAGNOSIS — R31.9 HEMATURIA, UNSPECIFIED TYPE: ICD-10-CM

## 2025-05-29 DIAGNOSIS — M61.10 FOP (FIBRODYSPLASIA OSSIFICANS PROGRESSIVA): ICD-10-CM

## 2025-05-29 DIAGNOSIS — R10.9 FLANK PAIN: ICD-10-CM

## 2025-05-29 DIAGNOSIS — R10.9 FLANK PAIN: Primary | ICD-10-CM

## 2025-05-29 PROCEDURE — 76770 US EXAM ABDO BACK WALL COMP: CPT

## 2025-05-29 PROCEDURE — 1036F TOBACCO NON-USER: CPT | Performed by: INTERNAL MEDICINE

## 2025-05-29 PROCEDURE — 99215 OFFICE O/P EST HI 40 MIN: CPT | Performed by: INTERNAL MEDICINE

## 2025-05-29 ASSESSMENT — PATIENT HEALTH QUESTIONNAIRE - PHQ9
2. FEELING DOWN, DEPRESSED OR HOPELESS: NOT AT ALL
1. LITTLE INTEREST OR PLEASURE IN DOING THINGS: NOT AT ALL
SUM OF ALL RESPONSES TO PHQ9 QUESTIONS 1 AND 2: 0

## 2025-05-29 NOTE — PROGRESS NOTES
Subjective   Patient ID: James Pittman is a 34 y.o. male who presents for UTI.  HPI        past medical history fibrodysplasia ossificans progressiva, constipation, and R inguinal hernia.     Patient presents with mother Rachel    Patient had an episode of gross hematuria that sort of started around Tuesday getting better now basically resolved today grade 3 out of 10 got better with antibiotics Augmentin I was called in worse without it  Patient states Friday and over the weekend he kind of felt horrible had some vomiting and nausea this is better right now  Dribbling of the urine over the years intermittently  Still having intermittent flank pain that seems to radiate to his inguinal and flank area from the bladder area groin pressure intermittently at times  Possible slight dysuria he was having somewhat better at present  Denies fever chills hematochezia melena hematemesis    Patient states he was to have a virtual appointment with the surgeon but he states the surgeon never showed up so he did not get it evaluated it was a coordinated call with his specialist with FOP as well      Health Maintenance:      Colonoscopy:      Mammogram:      Pelvic/Pap:      Low dose chest CT:      Aorta duplex:      Optho:      Podiatry:        Vaccines:      Prevnar 20:      Prevnar 13:      Pneumovax 23:      Tdap:      Shingrix:      COVID:      Influenza:        ROS:      General: denies fever/chills/weight loss      Head: denies HA/trauma/masses/dizziness      Eyes: denies vision change/loss of vision/blurry vision/diplopia/eye pain      Ears: denies hearing loss/tinnitus/otalgia/otorrhea      Nose: denies nasal drainage/anosmia      Throat: denies dysphagia/odynophagia      Lymphatics: denies lymph node swelling      Cardiac: denies CP/palpitations/orthopnea/PND      Pulmonary: denies dyspnea/cough/wheezing      GI: Chronic intermittent inguinal hernia right-sided persistent but getting somewhat better; has a hemorrhoid that  seems to pop out from his anus occasionally resolved at present denies abd pain/n/v/diarrhea/melena/hematochezia/hematemesis      : Dribbling of the urine occasionally over the years occasional urine incontinence for months uses incontinence wipes that seem to help; gross hematuria currently resolved; some flank pain intermittently into the groin denies dysuria/hematuria/change frequency      Genital: denies genital discharge/lesions      Skin: Chronic intermittent skin ulcerations from his underlying FOP condition s no new lesions enies rashes/lesions/masses      MSK: Patient with uncontrolled toenail fungus growth had a history of debridement from the podiatrist and also use topical antifungal that maybe helped in the past denies weakness/swelling/edema/gait imbalance/pain      Neuro: denies paresthesias/seizures/dysarthria      Psych: denies depression/anxiety/suicidal or homicidal ideations            Objective   /68      Physical Exam:     Physical Exam:    General: AOx3, NAD  Head: symmetric no masses/lesions  Eyes: EOMI/PERRLA, no scleral icterus or conjunctival erythema, negative APD  Ears: symmetric without deformity no masses/discharge, TMs pearly gray  Nose: nares symmetric without discharge, pink turbinates without masses  Throat: oral mucosa pink/moist without exudates or lesions  Neck: trachea midline, no thyromegaly or masses, negative carotid bruit  Lymphatics: no palpable pre or post auricular/ant or posterior cervical/supraclavicular/ axillary/epitrochlear/inguinal adenopathy  Cardiovascular: RRR , S1 and S2 auscultated, no murmur/rub/gallups. Negative s3 or s4.   Chest: No deformity and negative tenderness to palpation  Pulmonary:  CTA b/l, no wheeze/rhonchi/rales. Nonlabored  GI: Reducible right inguinal hernia about 1.5cm soft, Nontender/Nondistended, BSx4. No rebound/guarding. No hepatosplenomegaly  Rectal no overt mass hemorrhoid good rectal tone no bleeding   Rectal exam performed no  "masses no enlargement of the prostate there was palpable nontender  Musculoskeletal: Overgrown toenails with white cheesy torturous nails overgrowth under the nail contractures upper lower extremity wheelchair-bound no clubbing/cyanosis/edema. No deformity. Strength 5/5 UE and LE with full ROM.  2+ palpable DP/PTs b/l LE  Neurologic: CNII-XII grossly intact without focal deficits. 2/4 bicep/tricep/brachioradialis/patellar/Achilles reflex. Heel to shin and rapid alternating movements intact. Ambulation intact without assist.   Skin: No masses/lesions/tattoos  Psychiatric: affect appropriate                     No results found for: \"BMPR1A\", \"CBCDIF\"    Mother states due to FOP condition the patient cannot have any type of lab work or surgeries due to severe inflammatory response only needs a procedure if there is an emergency situation she reports per the FOP specialist in [place]    Patient states he tried to get into rheumatology but they canceled his appointment because they said they do not deal with FOP he has questions about palvarotene that apparently is a retinoid based medicine that can prevent further formation of FOP lesions I advised him he needs to talk to a specialist about this medicine but in my opinion would not recommend you feel comfortable with writing this medicine due to the side effect profile and the fact that this is more for prevention of new lesions which are not getting but you should talk to your specialist about this ultimately    Patient deferred genetics referral  Assessment/Plan   Diagnoses and all orders for this visit:  Flank pain  Comments:  Rule out obstructive nephrolithiasis  Orders:  -     US renal complete; Future  -     Urinalysis with Reflex Microscopic; Future  -     C. trachomatis / N. gonorrhoeae, Amplified, Urogenital; Future  -     Referral to Urology; Future  Hematuria, unspecified type  Comments:  Possible UTI nephrolithiasis less likely mass " malignancy  Orders:  -     Urinalysis with Reflex Microscopic; Future  -     C. trachomatis / N. gonorrhoeae, Amplified, Urogenital; Future  -     Referral to Urology; Future  FOP (fibrodysplasia ossificans progressiva)  -     Referral to Genetics; Future    Go to the ER for any severe or recurrent blood in the urine or uncontrolled pain in the flank  Water intake 12 ounces 3 times a day      Will need to follow-up with podiatry for debridement of the nails        Go to the ER for any severe pain or hernia that does not go back in    I sent a message to rheumatology inquiring about the medication palovarotene, in which specialty they would recommend being that rheumatology does not handle this condition they stated    May be a candidate for a banding procedure if internal hemorrhoid likely need EUA anoscopy to further evaluate  Recommend bowel regimen Colace 1 scoop of Benefiber daily; discontinue MiraLAX use    Continue home care nursing    Recheck urinalysis in 2 weeks    Thank you for making appointment today Héctor    Please stop at the  to schedule follow-up 2 months as we discussed    Ezra Mccartney DO, [facility] and med refill

## 2025-05-30 LAB
APPEARANCE UR: CLEAR
BACTERIA #/AREA URNS HPF: ABNORMAL /HPF
BACTERIA UR CULT: NORMAL
BILIRUB UR QL STRIP: NEGATIVE
COLOR UR: YELLOW
GLUCOSE UR QL STRIP: NEGATIVE
HGB UR QL STRIP: ABNORMAL
HYALINE CASTS #/AREA URNS LPF: ABNORMAL /LPF
KETONES UR QL STRIP: ABNORMAL
LEUKOCYTE ESTERASE UR QL STRIP: ABNORMAL
NITRITE UR QL STRIP: NEGATIVE
PH UR STRIP: 7.5 [PH] (ref 5–8)
PROT UR QL STRIP: ABNORMAL
RBC #/AREA URNS HPF: ABNORMAL /HPF
SERVICE CMNT-IMP: ABNORMAL
SP GR UR STRIP: 1.01 (ref 1–1.03)
SQUAMOUS #/AREA URNS HPF: ABNORMAL /HPF
WBC #/AREA URNS HPF: ABNORMAL /HPF

## 2025-06-13 LAB
APPEARANCE UR: CLEAR
BACTERIA #/AREA URNS HPF: ABNORMAL /HPF
BILIRUB UR QL STRIP: NEGATIVE
C TRACH RRNA SPEC QL NAA+PROBE: NOT DETECTED
COLOR UR: YELLOW
GLUCOSE UR QL STRIP: NEGATIVE
HGB UR QL STRIP: NEGATIVE
HYALINE CASTS #/AREA URNS LPF: ABNORMAL /LPF
KETONES UR QL STRIP: NEGATIVE
LEUKOCYTE ESTERASE UR QL STRIP: ABNORMAL
N GONORRHOEA RRNA SPEC QL NAA+PROBE: NOT DETECTED
NITRITE UR QL STRIP: NEGATIVE
PH UR STRIP: 7 [PH] (ref 5–8)
PROT UR QL STRIP: NEGATIVE
QUEST GC CT AMPLIFIED (ALWAYS MESSAGE): NORMAL
RBC #/AREA URNS HPF: ABNORMAL /HPF
SERVICE CMNT-IMP: ABNORMAL
SP GR UR STRIP: 1.01 (ref 1–1.03)
SQUAMOUS #/AREA URNS HPF: ABNORMAL /HPF
WBC #/AREA URNS HPF: ABNORMAL /HPF

## 2025-06-14 NOTE — PROGRESS NOTES
Subjective   Patient ID: James Pittman is a 34 y.o. male who presents for EVALUATION OF A RECENT UTI. PT HAD A UTI APPROX 8 MONTHS AGO.     HPI:   Are you experiencing:  Burning on urination --NO  Pain on urination  -- NO  Urinary frequency -- EVERY HOUR   Urinary urgency -- OCC  Urge incontinence -- OCC  Urinary stress incontinence  -- NO  Number of pads used per day --2 X / DAY -- DAMP TO WET  Enuresis -- YES  Nocturia-- NO  Hematuria -- WITH A RECENT UTI  Hesitancy -- OCC  Post void fullness --  NO  Strength of your stream-- COMFORTABLE     ROS:  General-- No C/O fever or chills  Head-- No C/O Dizziness  Eyes-- NO  C/O blurry or double vision  Ears-- No C/O hearing loss  Neck-- Supple  Chest-- No C/O pain or discomfort  Lungs-- No C/O shortness of breath  Abdomen-- No C/O  pain or discomfort, No nausea or vomiting  Back-- No C/O back pain or discomfort  Extremities-- No C/O swelling or pain    OBJECTIVE  General-- well-developed, well-nourished in NAD  Head-- normal cephalic, atraumatic  Eyes-- PERRL, EOM'S FROM, no jaundice  Neck-- Supple, without masses  Chest-- Normal bony structure  Abdomen-- soft, non tender, liver spleen not palpable. No suprapubic masses.  Back-- no flank masses palpable, no CVA tenderness on palpation or percussion-- PT MAY HAVE SCOLIOSIS -- HARD TO TELL WITH HIM IN A WHEELCHAIR   Lymph nodes-- No inguinal lymphadenopathy noted  Testis-- both down, non tender, without masses   Scrotum-- no hydrocele  Epididymis- no masses palpable  Extremities-- DECREASED  mass and strength for the pt's age--SPASTIC EXTREMITIES-- PT IS WHEELCHAIR BOUND   Neurological-- pt oriented x 3     URINALYSIS DIPSTICK-- SMALL LEUKOCYTES , NEG NITRITES     45 MIN PVR - 245 ML    5-25-25  RENAL U/S:  IMPRESSION:  Limited study due to patient's general medical condition  (fibrodysplasia ossificans progressiva)      Mild bilateral hydronephrosis.      Debris seen within urinary bladder. The patient was unable to  void.    ASSESSMENT / PLAN  A:  MILD BILATERAL HYDRONEPHROSIS SEEN ON A RENAL US/ DONE 5-25-25  SLIGHTLY ELEVATED PVR  PT IS INCONTINENT  OF URINE ALMOST ALL THE TIME   PATHOPHYSIOLOGY OF THE ABOVE AND OPTIONS OF FURTHER EVALUATION DISCUSSED IN DETAIL WITH THE PT AND HIS PARENTS  ALL QUESTIONS ANSWERED    PT HAS FIBRODYSPLASIA OSSIFICANS PROGRESSIVA   PT FORMS BONE TISSUE ANYWHERE THERE IS TRAUMA SECONDARY TO HIS GENETIC DISEASE-- THE FAMILY AND PATIENT ARE VERY CONCERNED ABOUT DOING A CYSTO FEARING HE WILL FORM BONE TISSUE IN THE URETHRA  P:  WE HAVE ELECTED TO FOLLOW A CONSERVATIVE COURSE OF THERAPY FOR NOW GIVEN THE ABOVE CONCERNS  PT TO START:  D-MANNOS  WITH CRANBERRY ONE BID IN AN EFFORT TO HELP PREVENT UTI'S FROM REOCCURRING  F/U ON AN OPEN DOOR POLICY - I WILL SEE THE PT BACK ANYTIME HE NEEDS HELP.   Jet Mayfield MD 06/14/25 12:10 PM

## 2025-06-16 ENCOUNTER — APPOINTMENT (OUTPATIENT)
Age: 35
End: 2025-06-16
Payer: MEDICAID

## 2025-06-16 VITALS — DIASTOLIC BLOOD PRESSURE: 66 MMHG | TEMPERATURE: 98.9 F | HEART RATE: 87 BPM | SYSTOLIC BLOOD PRESSURE: 106 MMHG

## 2025-06-16 DIAGNOSIS — R31.9 HEMATURIA, UNSPECIFIED TYPE: ICD-10-CM

## 2025-06-16 DIAGNOSIS — R32 ENURESIS: ICD-10-CM

## 2025-06-16 DIAGNOSIS — M61.10 FOP (FIBRODYSPLASIA OSSIFICANS PROGRESSIVA): ICD-10-CM

## 2025-06-16 DIAGNOSIS — R39.15 URGENCY OF MICTURITION: ICD-10-CM

## 2025-06-16 DIAGNOSIS — R39.11 HESITANCY OF MICTURITION: ICD-10-CM

## 2025-06-16 DIAGNOSIS — N39.41 URGENCY INCONTINENCE: ICD-10-CM

## 2025-06-16 DIAGNOSIS — R35.0 FREQUENCY OF MICTURITION: Primary | ICD-10-CM

## 2025-06-16 DIAGNOSIS — R35.1 NOCTURIA: ICD-10-CM

## 2025-06-16 DIAGNOSIS — R10.9 FLANK PAIN: ICD-10-CM

## 2025-06-16 LAB
POC APPEARANCE, URINE: CLEAR
POC BILIRUBIN, URINE: NEGATIVE
POC BLOOD, URINE: NEGATIVE
POC COLOR, URINE: YELLOW
POC GLUCOSE, URINE: NEGATIVE MG/DL
POC KETONES, URINE: NEGATIVE MG/DL
POC LEUKOCYTES, URINE: ABNORMAL
POC NITRITE,URINE: NEGATIVE
POC PH, URINE: 7 PH
POC PROTEIN, URINE: NEGATIVE MG/DL
POC SPECIFIC GRAVITY, URINE: 1.01
POC UROBILINOGEN, URINE: 0.2 EU/DL

## 2025-06-16 PROCEDURE — 99204 OFFICE O/P NEW MOD 45 MIN: CPT | Performed by: UROLOGY

## 2025-06-16 PROCEDURE — 51798 US URINE CAPACITY MEASURE: CPT | Performed by: UROLOGY

## 2025-06-16 PROCEDURE — 1036F TOBACCO NON-USER: CPT | Performed by: UROLOGY

## 2025-06-16 PROCEDURE — 81003 URINALYSIS AUTO W/O SCOPE: CPT | Performed by: UROLOGY

## 2025-06-16 NOTE — LETTER
June 17, 2025     Ezra Mccartney DO  [address] [facility]  [facility] [address]    Patient: James Pittman   YOB: 1990   Date of Visit: 6/16/2025       Dear Dr. Ezra Mccartney DO:    Thank you for referring James Pittman to me for evaluation. Below are my notes for this consultation.  If you have questions, please do not hesitate to call me. I look forward to following your patient along with you.       Sincerely,     Jet Mayfield MD      CC: No Recipients  ______________________________________________________________________________________    Subjective  Patient ID: James Pittman is a 34 y.o. male who presents for EVALUATION OF A RECENT UTI. PT HAD A UTI APPROX 8 MONTHS AGO.     HPI:   Are you experiencing:  Burning on urination --NO  Pain on urination  -- NO  Urinary frequency -- EVERY HOUR   Urinary urgency -- OCC  Urge incontinence -- OCC  Urinary stress incontinence  -- NO  Number of pads used per day --2 X / DAY -- DAMP TO WET  Enuresis -- YES  Nocturia-- NO  Hematuria -- WITH A RECENT UTI  Hesitancy -- OCC  Post void fullness --  NO  Strength of your stream-- COMFORTABLE     ROS:  General-- No C/O fever or chills  Head-- No C/O Dizziness  Eyes-- NO  C/O blurry or double vision  Ears-- No C/O hearing loss  Neck-- Supple  Chest-- No C/O pain or discomfort  Lungs-- No C/O shortness of breath  Abdomen-- No C/O  pain or discomfort, No nausea or vomiting  Back-- No C/O back pain or discomfort  Extremities-- No C/O swelling or pain    OBJECTIVE  General-- well-developed, well-nourished in NAD  Head-- normal cephalic, atraumatic  Eyes-- PERRL, EOM'S FROM, no jaundice  Neck-- Supple, without masses  Chest-- Normal bony structure  Abdomen-- soft, non tender, liver spleen not palpable. No suprapubic masses.  Back-- no flank masses palpable, no CVA tenderness on palpation or percussion-- PT MAY HAVE SCOLIOSIS -- HARD TO TELL WITH HIM IN A WHEELCHAIR   Lymph nodes-- No  inguinal lymphadenopathy noted  Testis-- both down, non tender, without masses   Scrotum-- no hydrocele  Epididymis- no masses palpable  Extremities-- DECREASED  mass and strength for the pt's age--SPASTIC EXTREMITIES-- PT IS WHEELCHAIR BOUND   Neurological-- pt oriented x 3     URINALYSIS DIPSTICK-- SMALL LEUKOCYTES , NEG NITRITES     45 MIN PVR - 245 ML    5-25-25  RENAL U/S:  IMPRESSION:  Limited study due to patient's general medical condition  (fibrodysplasia ossificans progressiva)      Mild bilateral hydronephrosis.      Debris seen within urinary bladder. The patient was unable to void.    ASSESSMENT / PLAN  A:  MILD BILATERAL HYDRONEPHROSIS SEEN ON A RENAL US/ DONE 5-25-25  SLIGHTLY ELEVATED PVR  PT IS INCONTINENT  OF URINE ALMOST ALL THE TIME   PATHOPHYSIOLOGY OF THE ABOVE AND OPTIONS OF FURTHER EVALUATION DISCUSSED IN DETAIL WITH THE PT AND HIS PARENTS  ALL QUESTIONS ANSWERED    PT HAS FIBRODYSPLASIA OSSIFICANS PROGRESSIVA   PT FORMS BONE TISSUE ANYWHERE THERE IS TRAUMA SECONDARY TO HIS GENETIC DISEASE-- THE FAMILY AND PATIENT ARE VERY CONCERNED ABOUT DOING A CYSTO FEARING HE WILL FORM BONE TISSUE IN THE URETHRA  P:  WE HAVE ELECTED TO FOLLOW A CONSERVATIVE COURSE OF THERAPY FOR NOW GIVEN THE ABOVE CONCERNS  PT TO START:  D-MANNOS  WITH CRANBERRY ONE BID IN AN EFFORT TO HELP PREVENT UTI'S FROM REOCCURRING  F/U ON AN OPEN DOOR POLICY - I WILL SEE THE PT BACK ANYTIME HE NEEDS HELP.   Jet Mayfield MD 06/14/25 12:10 PM

## 2025-06-24 DIAGNOSIS — K59.00 CONSTIPATION, UNSPECIFIED CONSTIPATION TYPE: Primary | ICD-10-CM

## 2025-06-24 RX ORDER — POLYETHYLENE GLYCOL 3350 17 G/17G
POWDER, FOR SOLUTION ORAL
Qty: 30 PACKET | Refills: 0 | Status: SHIPPED | OUTPATIENT
Start: 2025-06-24

## 2025-07-09 DIAGNOSIS — K59.09 CHRONIC CONSTIPATION: Primary | ICD-10-CM

## 2025-07-09 RX ORDER — PLECANATIDE 3 MG/1
3 TABLET ORAL DAILY
Qty: 90 TABLET | Refills: 1 | Status: SHIPPED | OUTPATIENT
Start: 2025-07-09 | End: 2026-07-09

## 2025-07-31 ENCOUNTER — APPOINTMENT (OUTPATIENT)
Dept: PRIMARY CARE | Facility: CLINIC | Age: 35
End: 2025-07-31
Payer: MEDICAID

## 2025-08-19 ENCOUNTER — OFFICE VISIT (OUTPATIENT)
Dept: PODIATRY | Facility: CLINIC | Age: 35
End: 2025-08-19
Payer: MEDICAID

## 2025-08-19 DIAGNOSIS — M79.672 FOOT PAIN, BILATERAL: Primary | ICD-10-CM

## 2025-08-19 DIAGNOSIS — M61.10 FOP (FIBRODYSPLASIA OSSIFICANS PROGRESSIVA): ICD-10-CM

## 2025-08-19 DIAGNOSIS — B35.1 ONYCHOMYCOSIS: ICD-10-CM

## 2025-08-19 DIAGNOSIS — L60.3 NAIL DYSTROPHY: ICD-10-CM

## 2025-08-19 DIAGNOSIS — M79.671 FOOT PAIN, BILATERAL: Primary | ICD-10-CM

## 2025-08-19 DIAGNOSIS — L85.3 XEROSIS CUTIS: ICD-10-CM

## 2025-08-19 PROCEDURE — 1036F TOBACCO NON-USER: CPT | Performed by: PODIATRIST

## 2025-08-19 PROCEDURE — 99204 OFFICE O/P NEW MOD 45 MIN: CPT | Performed by: PODIATRIST

## 2025-08-19 PROCEDURE — 99214 OFFICE O/P EST MOD 30 MIN: CPT | Performed by: PODIATRIST

## 2025-08-25 DIAGNOSIS — G89.4 CHRONIC PAIN SYNDROME: ICD-10-CM

## 2025-08-25 RX ORDER — PREDNISONE 20 MG/1
20 TABLET ORAL DAILY
Qty: 90 TABLET | Refills: 0 | Status: SHIPPED | OUTPATIENT
Start: 2025-08-25

## 2025-08-28 ENCOUNTER — TELEPHONE (OUTPATIENT)
Dept: PRIMARY CARE | Facility: CLINIC | Age: 35
End: 2025-08-28
Payer: MEDICAID

## 2025-08-28 DIAGNOSIS — M61.10 FOP (FIBRODYSPLASIA OSSIFICANS PROGRESSIVA): Primary | ICD-10-CM

## 2025-09-23 ENCOUNTER — APPOINTMENT (OUTPATIENT)
Dept: GENETICS | Facility: CLINIC | Age: 35
End: 2025-09-23
Payer: MEDICAID

## 2025-10-07 ENCOUNTER — APPOINTMENT (OUTPATIENT)
Dept: PRIMARY CARE | Facility: CLINIC | Age: 35
End: 2025-10-07
Payer: MEDICAID